# Patient Record
Sex: FEMALE | Race: WHITE | NOT HISPANIC OR LATINO | Employment: OTHER | URBAN - METROPOLITAN AREA
[De-identification: names, ages, dates, MRNs, and addresses within clinical notes are randomized per-mention and may not be internally consistent; named-entity substitution may affect disease eponyms.]

---

## 2023-03-21 ENCOUNTER — TELEPHONE (OUTPATIENT)
Dept: PRIMARY CARE CLINIC | Facility: CLINIC | Age: 83
End: 2023-03-21
Payer: COMMERCIAL

## 2023-03-21 NOTE — TELEPHONE ENCOUNTER
Called Pt, Per Dr. Barber's request to schedule an appointment to establish care. LVM on machine for Pt to call back when convenient to schedule.

## 2023-03-27 ENCOUNTER — TELEPHONE (OUTPATIENT)
Dept: FAMILY MEDICINE | Facility: CLINIC | Age: 83
End: 2023-03-27
Payer: COMMERCIAL

## 2023-03-27 NOTE — TELEPHONE ENCOUNTER
----- Message from Kiet Rojas sent at 3/27/2023  1:49 PM CDT -----  Regarding: Advice  Contact: Patient  Type:  Needs Medical Advice    Who Called: Patient  Symptoms (please be specific): wanted MD to have her ph#   How long has patient had these symptoms:    Pharmacy name and phone #:    Would the patient rather a call back or a response via [facility]? call  Best Call Back Number: [phone]  Additional Information: Thank you!

## 2023-03-30 ENCOUNTER — TELEPHONE (OUTPATIENT)
Dept: PRIMARY CARE CLINIC | Facility: CLINIC | Age: 83
End: 2023-03-30
Payer: COMMERCIAL

## 2023-03-30 NOTE — TELEPHONE ENCOUNTER
----- Message from Shruthi Ramírez MA sent at 3/22/2023 10:07 AM CDT -----  Contact: PT  Please inform Dr Barber thank you  ----- Message -----  From: Jacquie Wei  Sent: 3/21/2023   4:52 PM CDT  To: Sunil Baldwin Staff    Type:  Needs Medical Advice    Who Called: PT   Would the patient rather a call back or a response via [facility]? CALL   Best Call Back Number: [phone] (home)     Additional Information: PLEASE LET DR BARBER KNOW PT IS UNABLE TO SCHEDULE WITH HIM AT THIS TIME. HER INSURANCE WILL NOT COVER HER VISIT WITH HIM.